# Patient Record
Sex: FEMALE | Race: WHITE | Employment: FULL TIME | URBAN - METROPOLITAN AREA
[De-identification: names, ages, dates, MRNs, and addresses within clinical notes are randomized per-mention and may not be internally consistent; named-entity substitution may affect disease eponyms.]

---

## 2018-05-04 ENCOUNTER — APPOINTMENT (OUTPATIENT)
Dept: GENERAL RADIOLOGY | Facility: HOSPITAL | Age: 58
End: 2018-05-04
Attending: EMERGENCY MEDICINE
Payer: COMMERCIAL

## 2018-05-04 ENCOUNTER — APPOINTMENT (OUTPATIENT)
Dept: CT IMAGING | Facility: HOSPITAL | Age: 58
End: 2018-05-04
Attending: EMERGENCY MEDICINE
Payer: COMMERCIAL

## 2018-05-04 ENCOUNTER — HOSPITAL ENCOUNTER (OUTPATIENT)
Facility: HOSPITAL | Age: 58
Setting detail: OBSERVATION
Discharge: HOME | End: 2018-05-07
Attending: EMERGENCY MEDICINE | Admitting: HOSPITALIST
Payer: COMMERCIAL

## 2018-05-04 DIAGNOSIS — R07.89 CHEST PAIN RADIATING TO ARM: ICD-10-CM

## 2018-05-04 DIAGNOSIS — G43.809 OTHER MIGRAINE WITHOUT STATUS MIGRAINOSUS, NOT INTRACTABLE: Primary | ICD-10-CM

## 2018-05-04 PROBLEM — R07.9 CHEST PAIN: Status: ACTIVE | Noted: 2018-05-04

## 2018-05-04 PROCEDURE — 84484 ASSAY OF TROPONIN QUANT: CPT | Performed by: EMERGENCY MEDICINE

## 2018-05-04 PROCEDURE — 85025 COMPLETE CBC W/AUTO DIFF WBC: CPT | Performed by: EMERGENCY MEDICINE

## 2018-05-04 PROCEDURE — 80048 BASIC METABOLIC PNL TOTAL CA: CPT | Performed by: EMERGENCY MEDICINE

## 2018-05-04 PROCEDURE — 93005 ELECTROCARDIOGRAM TRACING: CPT

## 2018-05-04 PROCEDURE — 83036 HEMOGLOBIN GLYCOSYLATED A1C: CPT | Performed by: INTERNAL MEDICINE

## 2018-05-04 PROCEDURE — 85379 FIBRIN DEGRADATION QUANT: CPT | Performed by: INTERNAL MEDICINE

## 2018-05-04 PROCEDURE — 93010 ELECTROCARDIOGRAM REPORT: CPT | Performed by: EMERGENCY MEDICINE

## 2018-05-04 PROCEDURE — 96374 THER/PROPH/DIAG INJ IV PUSH: CPT

## 2018-05-04 PROCEDURE — 82962 GLUCOSE BLOOD TEST: CPT

## 2018-05-04 PROCEDURE — 71046 X-RAY EXAM CHEST 2 VIEWS: CPT | Performed by: EMERGENCY MEDICINE

## 2018-05-04 PROCEDURE — 99285 EMERGENCY DEPT VISIT HI MDM: CPT

## 2018-05-04 PROCEDURE — 81001 URINALYSIS AUTO W/SCOPE: CPT | Performed by: EMERGENCY MEDICINE

## 2018-05-04 PROCEDURE — 96375 TX/PRO/DX INJ NEW DRUG ADDON: CPT

## 2018-05-04 RX ORDER — NITROGLYCERIN 0.4 MG/1
0.4 TABLET SUBLINGUAL ONCE
Status: COMPLETED | OUTPATIENT
Start: 2018-05-04 | End: 2018-05-04

## 2018-05-04 RX ORDER — ONDANSETRON 2 MG/ML
4 INJECTION INTRAMUSCULAR; INTRAVENOUS ONCE
Status: COMPLETED | OUTPATIENT
Start: 2018-05-04 | End: 2018-05-04

## 2018-05-04 RX ORDER — GLUCOSAMINE SULFATE 500 MG
1 CAPSULE ORAL DAILY
Status: DISCONTINUED | OUTPATIENT
Start: 2018-05-05 | End: 2018-05-04

## 2018-05-04 RX ORDER — POTASSIUM CHLORIDE 20 MEQ/1
40 TABLET, EXTENDED RELEASE ORAL EVERY 4 HOURS
Status: COMPLETED | OUTPATIENT
Start: 2018-05-04 | End: 2018-05-04

## 2018-05-04 RX ORDER — METOPROLOL TARTRATE 50 MG/1
50 TABLET, FILM COATED ORAL ONCE
Status: COMPLETED | OUTPATIENT
Start: 2018-05-04 | End: 2018-05-04

## 2018-05-04 RX ORDER — ALPRAZOLAM 0.25 MG/1
0.25 TABLET ORAL
Status: COMPLETED | OUTPATIENT
Start: 2018-05-04 | End: 2018-05-04

## 2018-05-04 RX ORDER — IBUPROFEN 400 MG/1
200 TABLET ORAL EVERY 4 HOURS PRN
Status: DISCONTINUED | OUTPATIENT
Start: 2018-05-04 | End: 2018-05-05

## 2018-05-04 RX ORDER — ENOXAPARIN SODIUM 100 MG/ML
40 INJECTION SUBCUTANEOUS EVERY 24 HOURS
Status: DISCONTINUED | OUTPATIENT
Start: 2018-05-04 | End: 2018-05-07

## 2018-05-04 RX ORDER — METOPROLOL TARTRATE 5 MG/5ML
5 INJECTION INTRAVENOUS SEE ADMIN INSTRUCTIONS
Status: DISCONTINUED | OUTPATIENT
Start: 2018-05-04 | End: 2018-05-04

## 2018-05-04 RX ORDER — MELOXICAM 15 MG/1
15 TABLET ORAL DAILY
COMMUNITY

## 2018-05-04 RX ORDER — ALBUTEROL SULFATE 90 UG/1
1 AEROSOL, METERED RESPIRATORY (INHALATION) EVERY 4 HOURS PRN
Status: DISCONTINUED | OUTPATIENT
Start: 2018-05-04 | End: 2018-05-07

## 2018-05-04 RX ORDER — IBUPROFEN 400 MG/1
600 TABLET ORAL EVERY 4 HOURS PRN
Status: DISCONTINUED | OUTPATIENT
Start: 2018-05-04 | End: 2018-05-05

## 2018-05-04 RX ORDER — SODIUM CHLORIDE 0.9 % (FLUSH) 0.9 %
3 SYRINGE (ML) INJECTION AS NEEDED
Status: DISCONTINUED | OUTPATIENT
Start: 2018-05-04 | End: 2018-05-07

## 2018-05-04 RX ORDER — MULTIVIT-MIN/IRON FUM/FOLIC AC 7.5 MG-4
1 TABLET ORAL DAILY
COMMUNITY

## 2018-05-04 RX ORDER — KETOROLAC TROMETHAMINE 15 MG/ML
15 INJECTION, SOLUTION INTRAMUSCULAR; INTRAVENOUS EVERY 6 HOURS PRN
Status: DISCONTINUED | OUTPATIENT
Start: 2018-05-04 | End: 2018-05-05

## 2018-05-04 RX ORDER — MULTIVITAMIN WITH IRON
100 TABLET ORAL DAILY
Status: DISCONTINUED | OUTPATIENT
Start: 2018-05-05 | End: 2018-05-07

## 2018-05-04 RX ORDER — CYANOCOBALAMIN (VITAMIN B-12) 1000 MCG
TABLET, EXTENDED RELEASE ORAL DAILY
Status: DISCONTINUED | OUTPATIENT
Start: 2018-05-05 | End: 2018-05-07

## 2018-05-04 RX ORDER — MULTIVITAMIN WITH IRON
100 TABLET ORAL DAILY
COMMUNITY

## 2018-05-04 RX ORDER — ALBUTEROL SULFATE 90 UG/1
1 AEROSOL, METERED RESPIRATORY (INHALATION) EVERY 4 HOURS PRN
COMMUNITY

## 2018-05-04 RX ORDER — LISINOPRIL 20 MG/1
20 TABLET ORAL DAILY
Status: DISCONTINUED | OUTPATIENT
Start: 2018-05-05 | End: 2018-05-07

## 2018-05-04 RX ORDER — IBUPROFEN 400 MG/1
400 TABLET ORAL EVERY 4 HOURS PRN
Status: DISCONTINUED | OUTPATIENT
Start: 2018-05-04 | End: 2018-05-05

## 2018-05-04 RX ORDER — ACETAMINOPHEN 325 MG/1
650 TABLET ORAL EVERY 6 HOURS PRN
Status: DISCONTINUED | OUTPATIENT
Start: 2018-05-04 | End: 2018-05-07

## 2018-05-04 RX ORDER — METOPROLOL TARTRATE 50 MG/1
50 TABLET, FILM COATED ORAL ONCE AS NEEDED
Status: COMPLETED | OUTPATIENT
Start: 2018-05-04 | End: 2018-05-04

## 2018-05-04 RX ORDER — LISINOPRIL 20 MG/1
20 TABLET ORAL DAILY
COMMUNITY

## 2018-05-04 RX ORDER — ASPIRIN 81 MG/1
81 TABLET ORAL DAILY
Status: DISCONTINUED | OUTPATIENT
Start: 2018-05-04 | End: 2018-05-04

## 2018-05-04 RX ORDER — GLUCOSAMINE SULFATE 500 MG
1 CAPSULE ORAL DAILY
COMMUNITY

## 2018-05-04 RX ORDER — DILTIAZEM HYDROCHLORIDE 5 MG/ML
10 INJECTION INTRAVENOUS SEE ADMIN INSTRUCTIONS
Status: DISCONTINUED | OUTPATIENT
Start: 2018-05-04 | End: 2018-05-04

## 2018-05-04 RX ORDER — ACETAMINOPHEN 500 MG
TABLET ORAL
Status: COMPLETED
Start: 2018-05-04 | End: 2018-05-04

## 2018-05-04 RX ORDER — ACETAMINOPHEN 500 MG
1000 TABLET ORAL ONCE
Status: COMPLETED | OUTPATIENT
Start: 2018-05-04 | End: 2018-05-04

## 2018-05-04 RX ORDER — METOPROLOL TARTRATE 50 MG/1
100 TABLET, FILM COATED ORAL ONCE AS NEEDED
Status: COMPLETED | OUTPATIENT
Start: 2018-05-04 | End: 2018-05-04

## 2018-05-04 RX ORDER — MULTIPLE VITAMINS W/ MINERALS TAB 9MG-400MCG
1 TAB ORAL DAILY
Status: DISCONTINUED | OUTPATIENT
Start: 2018-05-05 | End: 2018-05-07

## 2018-05-04 RX ORDER — FLUTICASONE PROPIONATE 50 MCG
2 SPRAY, SUSPENSION (ML) NASAL DAILY
COMMUNITY

## 2018-05-04 RX ORDER — METOPROLOL TARTRATE 5 MG/5ML
INJECTION INTRAVENOUS
Status: DISCONTINUED
Start: 2018-05-04 | End: 2018-05-04 | Stop reason: WASHOUT

## 2018-05-04 NOTE — CONSULTS
Reason for Consultation: chest pain    Assessment/Plan:     1. Chest pain  - typical and atypical featueres  - unable to complete CTA  - suspect musculoskeletal  2. HTN  3.  Asthma      PLAN:  - Yolanda and Echo in AM      HPI:     Emiliano Bennett is a 61 year Instructions   Or      DilTIAZem HCl (CARDIZEM) injection 10 mg 10 mg Intravenous See Admin Instructions   Or      metoprolol Tartrate (LOPRESSOR) 5 MG/5ML injection 5 mg 5 mg Intravenous See Admin Instructions   Normal Saline Flush 0.9 % injection 3 mL 3 - -       No intake or output data in the 24 hours ending 05/04/18 2798    Hemodynamic parameters (last 24 hours):      CONS: well developed, well nourished. HEAD/FACE:no trauma, normocephalic. EYES:conjunctiva not injected, no xanthelasma.    ENT:mucosa

## 2018-05-04 NOTE — PROGRESS NOTES
[facility]  Herbal/OTC/Food supplement Notice:     Glucosamine CAPS 1 tablet      The Pharmacy Department does not provide herbal/dietary supplements for reasons including inability to check for drug interactions and iman

## 2018-05-04 NOTE — ED PROVIDER NOTES
Patient Seen in: [facility] AND [facility] Emergency Department    History   Patient presents with:  Chest Pain Angina (cardiovascular)    Stated Complaint:     HPI    61year old female with past medical history of asthma, breast cancer in remission, hypertensi above.    Physical Exam   ED Triage Vitals [05/04/18 0909]  BP: (!) 172/98  Pulse: 97  Resp: 18  Temp: 98.6 °F (37 °C)  Temp src: Oral  SpO2: 99 %  O2 Device: None (Room air)    Current:/76 (BP Location: Left arm)   Pulse 72   Temp 98.6 °F (37 °C) (O Clarity Urine Hazy (*)     Glucose Urine >=500 (*)     Leukocyte Esterase Urine Moderate (*)     All other components within normal limits   POCT GLUCOSE - Abnormal; Notable for the following:     POC Glucose  202 (*)     All other components within nor monitoring of interventions, collecting and interpreting tests and discussion with consultants but not including time spent performing procedures.     Medications   metoprolol Tartrate (LOPRESSOR) 5 MG/5ML injection 5 mg ( Intravenous See Alternative 5/4/18 Oral Given 5/4/18 1320)   ondansetron HCl (ZOFRAN) injection 4 mg (4 mg Intravenous Given 5/4/18 5448)       The patient had normal EKG, normal troponin, I felt that she would be a good candidate for CCTA from the ER to r/o cardiac etiology for her pain, p

## 2018-05-04 NOTE — ED NOTES
Shyanne Jewell MD called by Sanchez Wood MD with update on CCTA protocol and vitals.  Shyanne Jewell wants one more dose of PO Metoprolol and then up for test.

## 2018-05-04 NOTE — H&P
Patient presents with:  Chest Pain Angina (cardiovascular)       PCP: Radha Clement      History of Present Illness: Patient is a 61year old female with PMH sig for asthma, htn, hx of breast cancer s/p lumpectomy/radiation/chemothearpy here with chest radha x3   Pulm: Lungs clear bilaterally, normal respiratory effort  CV: Heart with regular rate and rhythm, no murmur. Normal PMI.   Abd: Abdomen soft, nontender, nondistended, no organomegaly, bowel sounds present  MSK: Full range of motion in extremities, no

## 2018-05-04 NOTE — ED INITIAL ASSESSMENT (HPI)
Pt came in via EMS for mid CP with SOB since 10am yesterday that radiated to left arm with fatigue and diarrhea on Wednesday. Hypoglycemic with EMS, given D50. Recheck with EMS mid 300s, reports hx of hypoglycemia as a child.  RR even and nonlabored, speaki

## 2018-05-04 NOTE — IMAGING NOTE
TO RAD HOLDING A 1414  HX TAKEN  Hx of asthma  Lungs sounds clear bilat a/p  \" I had an attack this morning I had to use my inhaler I thought the tightness was from my asthma \" denies viagra type meds usage.   Family hx heart disease dad had heart attack

## 2018-05-05 ENCOUNTER — APPOINTMENT (OUTPATIENT)
Dept: CV DIAGNOSTICS | Facility: HOSPITAL | Age: 58
End: 2018-05-05
Attending: INTERNAL MEDICINE
Payer: COMMERCIAL

## 2018-05-05 ENCOUNTER — APPOINTMENT (OUTPATIENT)
Dept: NUCLEAR MEDICINE | Facility: HOSPITAL | Age: 58
End: 2018-05-05
Attending: INTERNAL MEDICINE
Payer: COMMERCIAL

## 2018-05-05 PROCEDURE — 93017 CV STRESS TEST TRACING ONLY: CPT | Performed by: INTERNAL MEDICINE

## 2018-05-05 PROCEDURE — 84132 ASSAY OF SERUM POTASSIUM: CPT | Performed by: INTERNAL MEDICINE

## 2018-05-05 PROCEDURE — 83735 ASSAY OF MAGNESIUM: CPT | Performed by: INTERNAL MEDICINE

## 2018-05-05 PROCEDURE — 87493 C DIFF AMPLIFIED PROBE: CPT | Performed by: INTERNAL MEDICINE

## 2018-05-05 PROCEDURE — 93306 TTE W/DOPPLER COMPLETE: CPT | Performed by: INTERNAL MEDICINE

## 2018-05-05 PROCEDURE — 93016 CV STRESS TEST SUPVJ ONLY: CPT | Performed by: INTERNAL MEDICINE

## 2018-05-05 PROCEDURE — 78452 HT MUSCLE IMAGE SPECT MULT: CPT | Performed by: INTERNAL MEDICINE

## 2018-05-05 PROCEDURE — 82565 ASSAY OF CREATININE: CPT | Performed by: INTERNAL MEDICINE

## 2018-05-05 PROCEDURE — A4216 STERILE WATER/SALINE, 10 ML: HCPCS

## 2018-05-05 PROCEDURE — 93018 CV STRESS TEST I&R ONLY: CPT | Performed by: INTERNAL MEDICINE

## 2018-05-05 RX ORDER — 0.9 % SODIUM CHLORIDE 0.9 %
VIAL (ML) INJECTION
Status: COMPLETED
Start: 2018-05-05 | End: 2018-05-05

## 2018-05-05 NOTE — PROGRESS NOTES
[facility] Hospitalist Progress Note                                                                   [facility] Okey Cierra  6/8/1960    Subjective:  Pt states pain feels better this AM     Meds:   Sche pain.      Chest pain   Left arm pain   Hx of asthma  HTN  Hx of breast cancer s/p lumpectomy/radiation/chemotherapy   ?hypoglycemia      Chest pain   - rule out cardiac cause: trop x2 negative, EKG okay  - pt unable to tolerate CCTA, cards on board for fu

## 2018-05-05 NOTE — PLAN OF CARE
Problem: Patient/Family Goals  Goal: Patient/Family Long Term Goal  Patient's Long Term Goal: \"decrease stress in my life\"    Interventions:  - decrease workload  - See additional Care Plan goals for specific interventions    Outcome: Progressing    Goal

## 2018-05-05 NOTE — PLAN OF CARE
Pt requesting for Dr Josy Armstrong to consult with PCP Dr Yumiko Salinas. Dr Vicky Davidson is on call for Dr Yumiko Salinas.  Voice message left for Dr Juani Munguia to directly contact Dr Josy Armstrong with provided cell number

## 2018-05-05 NOTE — PROGRESS NOTES
Assessment and Plan:     1. Chest pain  - typical and atypical featueres  - unable to complete CTA  - suspect musculoskeletal  2. HTN  3.  Asthma        PLAN:  - Yolanda and Echo   - Home if negative      Subjective:     No c/o    Objective:   Temp:  [97.6 °

## 2018-05-05 NOTE — PLAN OF CARE
Problem: Patient Centered Care  Goal: Patient preferences are identified and integrated in the patient's plan of care  Interventions:  - What would you like us to know as we care for you?  I live alone   - Provide timely, complete, and accurate information threatening arrhythmias  - Monitor electrolytes and administer replacement therapy as ordered   Outcome: Progressing

## 2018-05-05 NOTE — PLAN OF CARE
Received call from radiologist Dr Luther Krabbe regarding Raymond Noriega revealed reversible apical ischemia    Dr Cherelle Barcenas and Dr Sammy Collado notified. Per Dr Sammy Collado - discharge cancelled and plan for angiogram Monday. No ASA order as pt has aspirin allergy.  No further

## 2018-05-06 PROCEDURE — 85610 PROTHROMBIN TIME: CPT | Performed by: INTERNAL MEDICINE

## 2018-05-06 PROCEDURE — 85730 THROMBOPLASTIN TIME PARTIAL: CPT | Performed by: INTERNAL MEDICINE

## 2018-05-06 PROCEDURE — 80048 BASIC METABOLIC PNL TOTAL CA: CPT | Performed by: INTERNAL MEDICINE

## 2018-05-06 PROCEDURE — 83735 ASSAY OF MAGNESIUM: CPT | Performed by: INTERNAL MEDICINE

## 2018-05-06 RX ORDER — ASPIRIN 81 MG/1
324 TABLET, CHEWABLE ORAL ONCE
Status: CANCELLED | OUTPATIENT
Start: 2018-05-06 | End: 2018-05-06

## 2018-05-06 RX ORDER — CHLORHEXIDINE GLUCONATE 4 G/100ML
30 SOLUTION TOPICAL
Status: COMPLETED | OUTPATIENT
Start: 2018-05-07 | End: 2018-05-07

## 2018-05-06 RX ORDER — SODIUM CHLORIDE 9 MG/ML
INJECTION, SOLUTION INTRAVENOUS CONTINUOUS
Status: DISCONTINUED | OUTPATIENT
Start: 2018-05-06 | End: 2018-05-07

## 2018-05-06 NOTE — PLAN OF CARE
Problem: Patient Centered Care  Goal: Patient preferences are identified and integrated in the patient's plan of care  Interventions:  - What would you like us to know as we care for you?  I live alone   - Provide timely, complete, and accurate information arrhythmias  - Monitor electrolytes and administer replacement therapy as ordered   Outcome: Progressing      Comments: Plan for angiogram Monday 5/7

## 2018-05-06 NOTE — PROGRESS NOTES
Assessment and Plan:     1. Chest pain  - typical and atypical featueres  - unable to complete CTA  - abn Yolanda  2. HTN  3.  Asthma        PLAN:  - Cath in AM-discussed indications, alternatives and risks with patient and daughter      Subjective:     No c

## 2018-05-06 NOTE — PLAN OF CARE
Radiology unable to electronically transfer final results of [address] test to be avaliable for viewing on Epic. Papercopy of results placed in chart.

## 2018-05-06 NOTE — PROGRESS NOTES
[facility] Hospitalist Progress Note                                                                   [facility]    Kennyjyoti Ladarius  6/8/1960    Subjective:  Pt states pain feels well this AM    Nervous about a sig for asthma, htn, hx of breast cancer s/p lumpectomy/radiation/chemothearpy here with chest pain.      Chest pain   Left arm pain   Hx of asthma  HTN  Hx of breast cancer s/p lumpectomy/radiation/chemotherapy   ?hypoglycemia      Chest pain   - stress t

## 2018-05-07 ENCOUNTER — APPOINTMENT (OUTPATIENT)
Dept: INTERVENTIONAL RADIOLOGY/VASCULAR | Facility: HOSPITAL | Age: 58
End: 2018-05-07
Attending: INTERNAL MEDICINE
Payer: COMMERCIAL

## 2018-05-07 VITALS
OXYGEN SATURATION: 95 % | TEMPERATURE: 98 F | BODY MASS INDEX: 30.43 KG/M2 | RESPIRATION RATE: 16 BRPM | HEART RATE: 100 BPM | HEIGHT: 63 IN | WEIGHT: 171.75 LBS | DIASTOLIC BLOOD PRESSURE: 61 MMHG | SYSTOLIC BLOOD PRESSURE: 102 MMHG

## 2018-05-07 PROCEDURE — 93458 L HRT ARTERY/VENTRICLE ANGIO: CPT

## 2018-05-07 PROCEDURE — B2151ZZ FLUOROSCOPY OF LEFT HEART USING LOW OSMOLAR CONTRAST: ICD-10-PCS | Performed by: INTERNAL MEDICINE

## 2018-05-07 PROCEDURE — 4A023N7 MEASUREMENT OF CARDIAC SAMPLING AND PRESSURE, LEFT HEART, PERCUTANEOUS APPROACH: ICD-10-PCS | Performed by: INTERNAL MEDICINE

## 2018-05-07 PROCEDURE — B2111ZZ FLUOROSCOPY OF MULTIPLE CORONARY ARTERIES USING LOW OSMOLAR CONTRAST: ICD-10-PCS | Performed by: INTERNAL MEDICINE

## 2018-05-07 RX ORDER — VERAPAMIL HYDROCHLORIDE 2.5 MG/ML
INJECTION, SOLUTION INTRAVENOUS
Status: COMPLETED
Start: 2018-05-07 | End: 2018-05-07

## 2018-05-07 RX ORDER — SODIUM CHLORIDE 9 MG/ML
INJECTION, SOLUTION INTRAVENOUS CONTINUOUS
Status: DISCONTINUED | OUTPATIENT
Start: 2018-05-07 | End: 2018-05-07

## 2018-05-07 RX ORDER — DIPHENHYDRAMINE HYDROCHLORIDE 50 MG/ML
INJECTION INTRAMUSCULAR; INTRAVENOUS
Status: COMPLETED
Start: 2018-05-07 | End: 2018-05-07

## 2018-05-07 RX ORDER — DOPAMINE HYDROCHLORIDE 320 MG/100ML
INJECTION, SOLUTION INTRAVENOUS
Status: COMPLETED
Start: 2018-05-07 | End: 2018-05-07

## 2018-05-07 RX ORDER — NITROGLYCERIN 20 MG/100ML
INJECTION INTRAVENOUS
Status: COMPLETED
Start: 2018-05-07 | End: 2018-05-07

## 2018-05-07 RX ORDER — HEPARIN SODIUM 1000 [USP'U]/ML
INJECTION, SOLUTION INTRAVENOUS; SUBCUTANEOUS
Status: COMPLETED
Start: 2018-05-07 | End: 2018-05-07

## 2018-05-07 RX ORDER — MIDAZOLAM HYDROCHLORIDE 1 MG/ML
INJECTION INTRAMUSCULAR; INTRAVENOUS
Status: DISCONTINUED
Start: 2018-05-07 | End: 2018-05-07 | Stop reason: WASHOUT

## 2018-05-07 NOTE — DISCHARGE SUMMARY
[facility] Internal Medicine Discharge Summary   Patient ID:  Stanley Barboza  [id number]  61year old  6/8/1960    Admit date: 5/4/2018    Discharge date and time: 5/7/2018     Attending Physician: Gato Kramer MD     Primary Care Physician: Anthony Sepulveda OR     Fluticasone Propionate 50 MCG/ACT Susp  Commonly known as:  FLONASE     Glucosamine 500 MG Caps     lisinopril 20 MG Tabs  Commonly known as:  PRINIVIL,ZESTRIL     Meloxicam 15 MG Tabs     MULTI-VITAMIN/MINERALS Tabs     PROAIR  (90 Base) MCG

## 2018-05-07 NOTE — PROGRESS NOTES
Post cath  coronarys with slow flow but no significant cad  Normal lv function  Full report to follow

## 2018-05-07 NOTE — PLAN OF CARE
Problem: Patient Centered Care  Goal: Patient preferences are identified and integrated in the patient's plan of care  Interventions:  - What would you like us to know as we care for you?  I live alone   - Provide timely, complete, and accurate information arrhythmias  - Monitor electrolytes and administer replacement therapy as ordered   Outcome: Progressing      Comments: vss, no complaints, cardiac angiogram today, possible discharge home if no intervention

## 2018-05-07 NOTE — PROGRESS NOTES
Tito Heading  [id number]  [unfilled]    Post procedure/ recovery hand-off report given to [facility]. Given  By [facility] RN  Patient's vital signs stable, access site dry and intact, no signs and symptoms of bleeding/ hematoma.     Elkin Goins RN

## 2018-05-08 ENCOUNTER — TELEPHONE (OUTPATIENT)
Dept: CARDIOLOGY UNIT | Facility: HOSPITAL | Age: 58
End: 2018-05-08

## 2019-04-25 ENCOUNTER — EXTERNAL RECORD (OUTPATIENT)
Dept: HEALTH INFORMATION MANAGEMENT | Facility: OTHER | Age: 59
End: 2019-04-25

## 2019-09-12 ENCOUNTER — IMAGING SERVICES (OUTPATIENT)
Dept: GENERAL RADIOLOGY | Age: 59
End: 2019-09-12

## 2019-09-12 ENCOUNTER — TELEPHONE (OUTPATIENT)
Dept: NEUROSURGERY | Age: 59
End: 2019-09-12

## 2019-09-12 ENCOUNTER — OFFICE VISIT (OUTPATIENT)
Dept: NEUROSURGERY | Age: 59
End: 2019-09-12

## 2019-09-12 VITALS
DIASTOLIC BLOOD PRESSURE: 84 MMHG | HEART RATE: 87 BPM | BODY MASS INDEX: 31.24 KG/M2 | OXYGEN SATURATION: 97 % | TEMPERATURE: 98.4 F | WEIGHT: 183 LBS | HEIGHT: 64 IN | SYSTOLIC BLOOD PRESSURE: 142 MMHG | RESPIRATION RATE: 16 BRPM

## 2019-09-12 DIAGNOSIS — M54.2 NECK PAIN: Primary | ICD-10-CM

## 2019-09-12 PROBLEM — I10 ESSENTIAL HYPERTENSION: Status: ACTIVE | Noted: 2019-09-12

## 2019-09-12 PROBLEM — Z91.09 ENVIRONMENTAL ALLERGIES: Status: ACTIVE | Noted: 2019-09-12

## 2019-09-12 PROBLEM — J45.909 ASTHMA: Status: ACTIVE | Noted: 2019-09-12

## 2019-09-12 PROBLEM — F41.9 ANXIETY: Status: ACTIVE | Noted: 2019-09-12

## 2019-09-12 PROCEDURE — 3079F DIAST BP 80-89 MM HG: CPT | Performed by: NEUROLOGICAL SURGERY

## 2019-09-12 PROCEDURE — 99203 OFFICE O/P NEW LOW 30 MIN: CPT | Performed by: NEUROLOGICAL SURGERY

## 2019-09-12 PROCEDURE — 72040 X-RAY EXAM NECK SPINE 2-3 VW: CPT | Performed by: INTERNAL MEDICINE

## 2019-09-12 PROCEDURE — 3077F SYST BP >= 140 MM HG: CPT | Performed by: NEUROLOGICAL SURGERY

## 2019-09-12 RX ORDER — LISINOPRIL 20 MG/1
20 TABLET ORAL
COMMUNITY

## 2019-09-12 RX ORDER — BISOPROLOL FUMARATE 5 MG/1
5 TABLET, FILM COATED ORAL DAILY
COMMUNITY

## 2019-09-12 RX ORDER — MULTIVITAMIN WITH IRON
100 TABLET ORAL
COMMUNITY

## 2019-09-12 RX ORDER — ALBUTEROL SULFATE 90 UG/1
1 AEROSOL, METERED RESPIRATORY (INHALATION)
COMMUNITY

## 2019-09-12 RX ORDER — GLUCOSAMINE SULFATE 500 MG
1 CAPSULE ORAL
COMMUNITY

## 2019-09-12 RX ORDER — MULTIVIT-MIN/IRON FUM/FOLIC AC 7.5 MG-4
1 TABLET ORAL
COMMUNITY

## 2019-09-12 RX ORDER — CELECOXIB 200 MG/1
200 CAPSULE ORAL 2 TIMES DAILY
COMMUNITY

## 2019-09-12 RX ORDER — FLUTICASONE PROPIONATE 50 MCG
2 SPRAY, SUSPENSION (ML) NASAL
COMMUNITY

## 2019-09-13 DIAGNOSIS — M54.2 NECK PAIN: Primary | ICD-10-CM

## 2019-09-17 ENCOUNTER — TELEPHONE (OUTPATIENT)
Dept: NEUROSURGERY | Age: 59
End: 2019-09-17

## 2019-10-01 ENCOUNTER — HOSPITAL (OUTPATIENT)
Dept: OTHER | Age: 59
End: 2019-10-01
Attending: NEUROLOGICAL SURGERY

## 2019-10-02 ENCOUNTER — HOSPITAL (OUTPATIENT)
Dept: OTHER | Age: 59
End: 2019-10-02

## (undated) NOTE — LETTER
[facility]  [address] [address]  Dept: [phone]  Loc: [phone]      May 7, 2018    Patient: Ruben Norton   Date of Visit: 5/4/2018       To Whom It May Concern:    Ruben Norton was seen and treated at [facility] o

## (undated) NOTE — LETTER
[facility]  [address] [place] [address]  Dept: [phone]  Loc: [phone]      May 7, 2018    Patient: Meghana Villatoro   Date of Visit: 5/4/2018       To Whom It May Concern:    To Whom It May Concern:    Meghana Villatoro was see